# Patient Record
Sex: MALE | Race: WHITE
[De-identification: names, ages, dates, MRNs, and addresses within clinical notes are randomized per-mention and may not be internally consistent; named-entity substitution may affect disease eponyms.]

---

## 2018-04-20 ENCOUNTER — HOSPITAL ENCOUNTER (EMERGENCY)
Dept: HOSPITAL 80 - FED | Age: 23
Discharge: HOME | End: 2018-04-20
Payer: COMMERCIAL

## 2018-04-20 VITALS — DIASTOLIC BLOOD PRESSURE: 89 MMHG | SYSTOLIC BLOOD PRESSURE: 129 MMHG

## 2018-04-20 DIAGNOSIS — S82.832A: Primary | ICD-10-CM
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
## 2018-04-20 PROCEDURE — L4386 NON-PNEUM WALK BOOT PRE CST: HCPCS

## 2018-04-20 NOTE — EDPHY
H & P


Time Seen by Provider: 04/20/18 10:23


HPI/ROS: 





CHIEF COMPLAINT:  Bilateral lower extremity injury





HISTORY OF PRESENT ILLNESS:  22-year-old male presents with a bilateral lower 

extremity injury.  He was unloading heavy objects from a Pallet, when the 

Pallet fell onto his legs and pinned his legs underneath it.  Estimated weight 

600 lb.  The palate was lifted and he was able to get out from under it.  He 

now complains of moderate pain in the right thigh and left leg.  No numbness or 

weakness.  No other injuries.  Fentanyl 50 mcg nebulized given by EMS.





REVIEW OF SYSTEMS: complete 10 point ROS negative except as noted in the HPI





Past Medical/Surgical History: 





Denies





Social History: 





No recent alcohol





Physical Exam: 





General Appearance:  Alert, anxious


Head:  Atraumatic


Eyes:  No conjunctival erythema, PERRLA, EOMI


ENT, Mouth:  no oral trauma, no bony tenderness


Neck:  Nontender, full range of motion without pain


Respiratory:  No chest wall tenderness, lungs clear bilaterally


Cardiovascular:  Regular rate and rhythm


Abdomen:  Abdomen is soft and nontender


Skin:  No lacerations, no abrasions


Back:  No midline T/L/S tenderness


Extremities:  Pelvis is stable and nontender; right lower extremity-tenderness, 

abrasion and swelling over the medial aspect of the distal thigh, right hip/

knee range of motion normal and non painful; left lower extremity-tenderness, 

abrasion and swelling over the mid aspect of the lower leg and over the lateral 

malleolus, ankle ROM painful, range of motion of the hip/knee without pain.  

Compartments are soft, mild swelling present.


Neurological:  alert, normal motor function, normal sensory exam


Vascular: 2+ dorsalis pedis pulses


Psychiatric:  Anxious





Constitutional: 


 Initial Vital Signs











Temperature (C)  37.2 C   04/20/18 11:56


 


Heart Rate  88   04/20/18 11:56


 


Respiratory Rate  18   04/20/18 11:56


 


Blood Pressure  129/89 H  04/20/18 11:56


 


O2 Sat (%)  94   04/20/18 11:56











Allergies/Adverse Reactions: 


 





No Known Allergies Allergy (Unverified 04/20/18 11:47)


 








Home Medications: 














 Medication  Instructions  Recorded


 


Hydrocodone/APAP 5/325 [Norco 1 - 2 tab PO Q4H PRN #10 tab 04/20/18





5/325]  














Medical Decision Making





- Diagnostics


Imaging Results: 


Femur X-Ray  04/20/18 10:24


Impression: Negative


 


2. Left Tibia-Fibula, 4 portable views


 


History: Heavy work equipment fell on legs today, pain


 


Findings: Prominent soft tissue swelling overlies the lateral malleolus. There 

is a nondisplaced oblique fracture through the distal fibula. There is a large 

ankle joint effusion. The knee joint and ankle joint are normally aligned.


 


Impression: Nondisplaced distal fibular fracture.








Tibia/Fibula X-Ray  04/20/18 10:24


Impression: Negative


 


2. Left Tibia-Fibula, 4 portable views


 


History: Heavy work equipment fell on legs today, pain


 


Findings: Prominent soft tissue swelling overlies the lateral malleolus. There 

is a nondisplaced oblique fracture through the distal fibula. There is a large 

ankle joint effusion. The knee joint and ankle joint are normally aligned.


 


Impression: Nondisplaced distal fibular fracture.





ED Course/Re-evaluation: 





This patient presents as a limited trauma activation for trauma to his legs.  

He refuses IV access.  He was sent to x-ray.  X-rays revealed a left distal 

fibular fracture.  X-ray results discussed with the patient.  He was placed in 

a Kay boot and on crutches.  Percocet 2 tablets orally given and Toradol 60 

mg IM.  He will follow up with Ortho.  At risk for compartment syndrome, given 

crush injury.  Warning signs discussed. 


Differential Diagnosis: 





Differential diagnosis includes though it is not limited to open fracture, 

dislocation, compartment syndrome, neurovascular compromise.





- Data Points


Medications Given: 


 








Discontinued Medications





Fentanyl (Sublimaze)  100 mcg IVP EDNOW ONE


   Stop: 04/20/18 11:28


   Last Admin: 04/20/18 11:29 Dose:  100 mcg


Ketorolac Tromethamine (Toradol)  60 mg IM EDNOW ONE


   Stop: 04/20/18 11:25


   Last Admin: 04/20/18 11:30 Dose:  60 mg


Oxycodone/Acetaminophen (Percocet 5/325)  1 tab PO EDNOW ONE


   Stop: 04/20/18 11:26


   Last Admin: 04/20/18 11:30 Dose:  1 tab








Departure





- Departure


Disposition: Home, Routine, Self-Care


Clinical Impression: 


Fracture of distal end of left fibula


Qualifiers:


 Encounter type: initial encounter Fracture type: closed Fracture morphology: 

unspecified fracture morphology Qualified Code(s): S82.832A - Other fracture of 

upper and lower end of left fibula, initial encounter for closed fracture





Condition: Good


Instructions:  Leg Fracture (ED)


Additional Instructions: 


Ibuprofen 600 mg 3 times daily while the pain persists.


Return for worsening pain, numbness, weakness, any concerns.





Referrals: 


Jonn Kim MD [Medical Doctor] - As per Instructions (Call to make an 

appointment.)


Stand Alone Forms:  Work Excuse


Prescriptions: 


Hydrocodone/APAP 5/325 [Norco 5/325] 1 - 2 tab PO Q4H PRN #10 tab


 PRN Reason: Pain, Moderate